# Patient Record
Sex: MALE | Race: WHITE
[De-identification: names, ages, dates, MRNs, and addresses within clinical notes are randomized per-mention and may not be internally consistent; named-entity substitution may affect disease eponyms.]

---

## 2020-07-26 ENCOUNTER — HOSPITAL ENCOUNTER (EMERGENCY)
Dept: HOSPITAL 41 - JD.ED | Age: 35
Discharge: HOME | End: 2020-07-26
Payer: COMMERCIAL
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
## 2020-07-26 DIAGNOSIS — T60.91XA: Primary | ICD-10-CM
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
## 2020-07-26 DIAGNOSIS — T20.40XA: ICD-10-CM

## 2020-07-26 NOTE — EDM.PDOC
ED HPI GENERAL MEDICAL PROBLEM





- General


Chief Complaint: Burn


Stated Complaint: CHEMICAL BURNS TO FACE


Time Seen by Provider: 07/26/20 03:10


Source of Information: Reports: Patient


History Limitations: Reports: No Limitations





- History of Present Illness


INITIAL COMMENTS - FREE TEXT/NARRATIVE: 





This is a 35-year-old male.  He was using a beta Cyfluthrin insecticide all day 

yesterday on and off and apparently got some on his face.  He noted some burning

yesterday evening and he washed his face thoroughly it did not seem to help so 

use soap and water to wash his face and that did not seem to help his face is 

still burning and slightly red so he comes to the ER for evaluation.  He says he

decontaminated himself according to the MSDS for this insecticide.  He wants 

something to stop the burning.  At this time what ever this insecticide is done 

for his skin is going to take time to resolve.


  ** Face/Facial


Pain Score (Numeric/FACES): 6





- Related Data


                                    Allergies











Allergy/AdvReac Type Severity Reaction Status Date / Time


 


No Known Allergies Allergy   Verified 07/26/20 03:08











Home Meds: 


                                    Home Meds





. [No Known Home Meds]  07/26/20 [History]











Past Medical History





- Past Surgical History


Musculoskeletal Surgical History: Reports: Shoulder Surgery





Social & Family History





- Tobacco Use


Smoking Status *Q: Never Smoker





ED ROS GENERAL





- Review of Systems


Review Of Systems: See Below


Constitutional: Reports: No Symptoms


HEENT: Reports: No Symptoms


Respiratory: Reports: No Symptoms


Cardiovascular: Reports: No Symptoms


Endocrine: Reports: No Symptoms


GI/Abdominal: Reports: No Symptoms


: Reports: No Symptoms


Musculoskeletal: Reports: No Symptoms


Skin: Reports: Other (As per HPI)


Neurological: Reports: No Symptoms


Psychiatric: Reports: No Symptoms


Hematologic/Lymphatic: Reports: No Symptoms





ED EXAM, BURN/SMOKE INHALATION





- Physical Exam


Exam: See Below


Exam Limited By: No Limitations


General Appearance: Alert, WD/WN, No Apparent Distress


Eye Exam: Bilateral Eye: Normal Inspection, Other (He denies getting any of this

 chemical in his eyes.)


Ears (Abbreviated): Normal External Exam


Mouth/Throat: No Symptoms Reported


Head: Other (He has some erythema across his nose and across his cheeks 

bilaterally but they do not extend into his lateral face or his forehead.  It 

just appears to be a erythematous noninfected burn.)


Neck: No Symptoms


Respiratory: No Respiratory Distress


Back Exam: Full Range of Motion


Extremities: Normal Inspection, Normal Range of Motion


Neurological: Alert, Oriented


Psychiatric: Normal Affect, Normal Mood


Skin Exam: Warm, Dry





Course





- Vital Signs


Last Recorded V/S: 





                                Last Vital Signs











Temp  97.5 F   07/26/20 03:04


 


Pulse  89   07/26/20 03:04


 


Resp  16   07/26/20 03:04


 


BP  140/95 H  07/26/20 03:04


 


Pulse Ox  98   07/26/20 03:04














- Re-Assessments/Exams


Free Text/Narrative Re-Assessment/Exam: 





07/26/20 03:29


I did speak to [facility] regarding this a chemical burn.  They indicate 

that bacitracin ointment or triple antibiotic ointment might make it feel better

 and cool compresses but other than that it just takes time to heal.





Departure





- Departure


Time of Disposition: 03:28


Disposition: Home, Self-Care 01


Condition: Fair


Clinical Impression: 


Chemical burn of face


Qualifiers:


 Encounter type: initial encounter Qualified Code(s): T20.40XA - Corrosion of 

unspecified degree of head, face, and neck, unspecified site, initial encounter








- Discharge Information


*PRESCRIPTION DRUG MONITORING PROGRAM REVIEWED*: Not Applicable


*COPY OF PRESCRIPTION DRUG MONITORING REPORT IN PATIENT JUANCARLOS: Not Applicable


Instructions:  Chemical Burn, Adult, Easy-to-Read


Referrals: 


PCP,None [Primary Care Provider] - 


Forms:  ED Department Discharge, ED Return to Work/School Form


Additional Instructions: 


Continue to use triple antibiotic ointment or bacitracin ointment for the next 

couple of days, stay out of the sun since a chemical burn and a sunburn will 

make this much worse, you may use cool compresses to the face to help with the 

burning, return to the ER if needed





Sepsis Event Note (ED)





- Evaluation


Sepsis Screening Result: No Definite Risk





- Focused Exam


Vital Signs: 





                                   Vital Signs











  Temp Pulse Resp BP Pulse Ox


 


 07/26/20 03:04  97.5 F  89  16  140/95 H  98

## 2020-08-09 ENCOUNTER — HOSPITAL ENCOUNTER (EMERGENCY)
Dept: HOSPITAL 41 - JD.ED | Age: 35
Discharge: HOME | End: 2020-08-09
Payer: COMMERCIAL

## 2020-08-09 DIAGNOSIS — E87.2: ICD-10-CM

## 2020-08-09 DIAGNOSIS — Z20.828: ICD-10-CM

## 2020-08-09 DIAGNOSIS — Z87.891: ICD-10-CM

## 2020-08-09 DIAGNOSIS — E86.0: ICD-10-CM

## 2020-08-09 DIAGNOSIS — E87.6: Primary | ICD-10-CM

## 2020-08-09 PROCEDURE — 99285 EMERGENCY DEPT VISIT HI MDM: CPT

## 2020-08-09 PROCEDURE — 96360 HYDRATION IV INFUSION INIT: CPT

## 2020-08-09 PROCEDURE — 85379 FIBRIN DEGRADATION QUANT: CPT

## 2020-08-09 PROCEDURE — 80306 DRUG TEST PRSMV INSTRMNT: CPT

## 2020-08-09 PROCEDURE — 96361 HYDRATE IV INFUSION ADD-ON: CPT

## 2020-08-09 PROCEDURE — 86140 C-REACTIVE PROTEIN: CPT

## 2020-08-09 PROCEDURE — 85007 BL SMEAR W/DIFF WBC COUNT: CPT

## 2020-08-09 PROCEDURE — U0002 COVID-19 LAB TEST NON-CDC: HCPCS

## 2020-08-09 PROCEDURE — 83735 ASSAY OF MAGNESIUM: CPT

## 2020-08-09 PROCEDURE — 84484 ASSAY OF TROPONIN QUANT: CPT

## 2020-08-09 PROCEDURE — 84443 ASSAY THYROID STIM HORMONE: CPT

## 2020-08-09 PROCEDURE — 93005 ELECTROCARDIOGRAM TRACING: CPT

## 2020-08-09 PROCEDURE — 87635 SARS-COV-2 COVID-19 AMP PRB: CPT

## 2020-08-09 PROCEDURE — 81001 URINALYSIS AUTO W/SCOPE: CPT

## 2020-08-09 PROCEDURE — 36415 COLL VENOUS BLD VENIPUNCTURE: CPT

## 2020-08-09 PROCEDURE — 85027 COMPLETE CBC AUTOMATED: CPT

## 2020-08-09 PROCEDURE — 80053 COMPREHEN METABOLIC PANEL: CPT

## 2020-08-09 PROCEDURE — 71046 X-RAY EXAM CHEST 2 VIEWS: CPT

## 2020-08-09 PROCEDURE — 80048 BASIC METABOLIC PNL TOTAL CA: CPT

## 2020-08-09 NOTE — EDM.PDOC
ED HPI GENERAL MEDICAL PROBLEM





- General


Chief Complaint: General


Stated Complaint: RAPID HEART RATE,SEEING YELLOW, WEAKNESS


Time Seen by Provider: 08/09/20 13:54


Source of Information: Reports: Patient, Other (Friend)


History Limitations: Reports: No Limitations





- History of Present Illness


INITIAL COMMENTS - FREE TEXT/NARRATIVE: 





Mr. Choe is a 35-year-old man with no chronic medical problems, who now 

presents to the ED complaining of generalized weakness, rapid palpitations, 

feeling hot and sweaty, and seeing yellow-tinged, spotty vision, ever since he 

woke up around 08:00 this morning.  No recent fever, chills, cough, dyspnea, 

nausea, vomiting, or recent diarrhea.  He states that he drank 2 shots of 

alcohol last night.  He denies prior similar symptoms.  He states that he did 

not take any over-the-counter or home remedies prior to coming to the ED.





Here in the ED, the patient is initially found to be tachycardic at 104 bpm, 

otherwise, he is hemodynamically stable, afebrile, saturating 98% on room air.





Other than this morning's symptoms, the patient denies recent fever, chills, 

sore throat, ear pain, nasal or sinus congestion, cough, dyspnea, chest pain, n

ausea, vomiting, constipation, diarrhea, abdominal pain, urinary symptoms, 

recent weight gain or weight loss, recent bloody bowel movements or black bowel 

movements, recent joint aches, headaches, or rashes.








The patient does not have a PCP.











- Related Data


                                    Allergies











Allergy/AdvReac Type Severity Reaction Status Date / Time


 


No Known Allergies Allergy   Verified 07/26/20 03:08











Home Meds: 


                                    Home Meds





. [No Known Home Meds]  07/26/20 [History]











Past Medical History





- Past Surgical History


Musculoskeletal Surgical History: Reports: Shoulder Surgery (left, arthroscopic)





Social & Family History





- Family History


Family Medical History: Noncontributory





- Tobacco Use


Smoking Status *Q: Former Smoker


Tobacco Use Within Last Twelve Months: Smokeless Tobacco (Quit chewing tobacco 

around Mar 2020)


Years of Tobacco use: 15


Packs/Tins Daily: 1


Month/Year Tobacco Last Used: Quit 2015





- Alcohol Use


Alcohol Use History: Yes


Alcohol Use Frequency: Socially





- Recreational Drug Use


Recreational Drug Use: Yes


Drug Use in Last 12 Months: No


Recreational Drug Type: Reports: Cocaine (hasn't snorted in "years"), 

Marijuana/Hashish (last smoked around 2010)





- Living Situation & Occupation


Living situation: Reports: Single, with Family


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





ED ROS GENERAL





- Review of Systems


Review Of Systems: Comprehensive ROS is negative, except as noted in HPI.





ED EXAM, GENERAL





- Physical Exam


Exam: See Below


Exam Limited By: No Limitations


General Appearance: Alert, No Apparent Distress, Thin


Eye Exam: Bilateral Eye: EOMI, Normal Inspection


Ears: Normal External Exam, Hearing Grossly Normal


Nose: Normal Inspection


Throat/Mouth: Normal Inspection, Normal Lips, Normal Voice, No Airway Compromise


Head: Atraumatic, Normocephalic


Neck: Normal Inspection, Full Range of Motion


Respiratory/Chest: No Respiratory Distress, Lungs Clear, Normal Breath Sounds, 

No Accessory Muscle Use


Cardiovascular: Normal Peripheral Pulses, No Edema, No Gallop, No JVD, No 

Murmur, No Rub, Tachycardia (regular)


Peripheral Pulses: 3+: Radial (L), Radial (R)


GI/Abdominal: Normal Bowel Sounds, Soft, Non-Tender, No Organomegaly, No 

Distention, No Abnormal Bruit, No Mass


 (Male) Exam: Deferred


Rectal (Males) Exam: Deferred


Back Exam: Normal Inspection, Full Range of Motion, NT


Extremities: Normal Inspection, Normal Range of Motion, No Pedal Edema, Normal 

Capillary Refill


Neurological: Alert, Oriented, CN II-XII Intact, Normal Cognition, No 

Motor/Sensory Deficits


Psychiatric: Normal Affect


Skin Exam: Warm, Dry, Intact, Normal Color, No Rash





EKG INTERPRETATION


EKG Date: 08/09/20


Time: 14:17


Rhythm: NSR


Rate (Beats/Min): 86


Axis: Normal


P-Wave: Present


QRS: RBBB (incomplete)


ST-T: Normal


QT: Normal


Comparison: NA - No Prior EKG





Course





- Vital Signs


Last Recorded V/S: 


                                Last Vital Signs











Temp  36.2 C   08/09/20 13:53


 


Pulse  104 H  08/09/20 13:53


 


Resp  16   08/09/20 13:53


 


BP  125/66   08/09/20 13:53


 


Pulse Ox  98   08/09/20 13:53








                                        





Orthostatic Blood Pressure [     117/74


Standing]                        


Orthostatic Blood Pressure [     118/71


Supine]                          











- Orders/Labs/Meds


Orders: 


                               Active Orders 24 hr











 Category Date Time Status


 


 EKG Documentation Completion [RC] STAT Care  08/09/20 14:09 Active


 


 Orthostatic Vital Signs [RC] STAT Care  08/09/20 14:09 Active


 


 CORONAVIRUS COVID-19 PCR [facility] Stat Lab  08/09/20 14:11 Ordered











Labs: 


                                Laboratory Tests











  08/09/20 08/09/20 08/09/20 Range/Units





  14:45 14:45 14:45 


 


WBC  15.08 H    (4.23-9.07)  K/mm3


 


RBC  5.27    (4.63-6.08)  M/mm3


 


Hgb  15.5    (13.7-17.5)  gm/dl


 


Hct  45.9    (40.1-51.0)  %


 


MCV  87.1    (79.0-92.2)  fl


 


MCH  29.4    (25.7-32.2)  pg


 


MCHC  33.8    (32.2-35.5)  g/dl


 


RDW Std Deviation  40.1    (35.1-43.9)  fL


 


Plt Count  323    (163-337)  K/mm3


 


MPV  9.8    (9.4-12.3)  fl


 


Neutrophils % (Manual)  89 H    (40-60)  %


 


Band Neutrophils %  0    (0-10)  %


 


Lymphocytes % (Manual)  10 L    (20-40)  %


 


Atypical Lymphs %  0    %


 


Monocytes % (Manual)  0 L    (2-10)  %


 


Eosinophils % (Manual)  0 L    (0.8-7.0)  %


 


Basophils % (Manual)  1    (0.2-1.2)  


 


Toxic Granulation      


 


Platelet Estimate  Adequate    


 


Plt Morphology Comment  Normal    


 


RBC Morph Comment  Normal    


 


D-Dimer, Quantitative    0.41  (0.19-0.50)  mg/L


 


Sodium   147 H   (136-145)  mEq/L


 


Potassium   3.1 L   (3.5-5.1)  mEq/L


 


Chloride   106   ()  mEq/L


 


Carbon Dioxide   19 L   (21-32)  mEq/L


 


Anion Gap   25.1 H   (5-15)  


 


BUN   22 H   (7-18)  mg/dL


 


Creatinine   1.5 H   (0.7-1.3)  mg/dL


 


Est Cr Clr Drug Dosing   76.40   mL/min


 


Estimated GFR (MDRD)   54   (>60)  mL/min


 


BUN/Creatinine Ratio   14.7   (14-18)  


 


Glucose   67 L   ()  mg/dL


 


Calcium   9.1   (8.5-10.1)  mg/dL


 


Magnesium   2.1   (1.8-2.4)  mg/dl


 


Total Bilirubin   0.3   (0.2-1.0)  mg/dL


 


AST   34   (15-37)  U/L


 


ALT   38   (16-63)  U/L


 


Alkaline Phosphatase   98   ()  U/L


 


Troponin I   < 0.017   (0.00-0.056)  ng/mL


 


C-Reactive Protein   <0.2   (<1.0)  mg/dL


 


Total Protein   7.9   (6.4-8.2)  g/dl


 


Albumin   4.3   (3.4-5.0)  g/dl


 


Globulin   3.6   gm/dL


 


Albumin/Globulin Ratio   1.2   (1-2)  


 


TSH 3rd Generation   0.790   (0.358-3.74)  uIU/mL


 


Urine Color     (Yellow)  


 


Urine Appearance     (Clear)  


 


Urine pH     (5.0-8.0)  


 


Ur Specific Gravity     (1.005-1.030)  


 


Urine Protein     (Negative)  


 


Urine Glucose (UA)     (Negative)  


 


Urine Ketones     (Negative)  


 


Urine Occult Blood     (Negative)  


 


Urine Nitrite     (Negative)  


 


Urine Bilirubin     (Negative)  


 


Urine Urobilinogen     (0.2-1.0)  


 


Ur Leukocyte Esterase     (Negative)  


 


Urine RBC     (0-5)  /hpf


 


Urine WBC     (0-5)  /hpf


 


Ur Squamous Epith Cells     (0-5)  /hpf


 


Urine Bacteria     (FEW)  /hpf


 


Urine Mucus     (FEW)  /hpf


 


Urine Opiates Screen     (ADBHIX=808)  


 


Ur Buprenorphine Scrn     (CUTOFF=10)  


 


Ur Oxycodone Screen     (IJZ1YL=219)  


 


Urine Methadone Screen     (PTD0ZO=827)  


 


Ur Propoxyphene Screen     (SMTXCM=404)  


 


Ur Barbiturates Screen     (HDRRJH=491)  


 


Ur Tricyclics Screen     (YNDRRE=542)  


 


Ur Phencyclidine Scrn     (CUTOFF=25)  


 


Ur Amphetamine Screen     (MWCWRA=588)  


 


U Methamphetamines Scrn     (KTHOFM=492)  


 


U Benzodiazepines Scrn     (DXNRCC=550)  


 


U Cocaine Metab Screen     (FEIAXU=341)  


 


U Marijuana (THC) Screen     (CUTOFF=50)  














  08/09/20 08/09/20 08/09/20 Range/Units





  18:08 18:12 18:12 


 


WBC     (4.23-9.07)  K/mm3


 


RBC     (4.63-6.08)  M/mm3


 


Hgb     (13.7-17.5)  gm/dl


 


Hct     (40.1-51.0)  %


 


MCV     (79.0-92.2)  fl


 


MCH     (25.7-32.2)  pg


 


MCHC     (32.2-35.5)  g/dl


 


RDW Std Deviation     (35.1-43.9)  fL


 


Plt Count     (163-337)  K/mm3


 


MPV     (9.4-12.3)  fl


 


Neutrophils % (Manual)     (40-60)  %


 


Band Neutrophils %     (0-10)  %


 


Lymphocytes % (Manual)     (20-40)  %


 


Atypical Lymphs %     %


 


Monocytes % (Manual)     (2-10)  %


 


Eosinophils % (Manual)     (0.8-7.0)  %


 


Basophils % (Manual)     (0.2-1.2)  


 


Toxic Granulation     


 


Platelet Estimate     


 


Plt Morphology Comment     


 


RBC Morph Comment     


 


D-Dimer, Quantitative     (0.19-0.50)  mg/L


 


Sodium  141    (136-145)  mEq/L


 


Potassium  4.4    (3.5-5.1)  mEq/L


 


Chloride  105    ()  mEq/L


 


Carbon Dioxide  22    (21-32)  mEq/L


 


Anion Gap  18.4 H    (5-15)  


 


BUN  19 H    (7-18)  mg/dL


 


Creatinine  1.3    (0.7-1.3)  mg/dL


 


Est Cr Clr Drug Dosing  88.15    mL/min


 


Estimated GFR (MDRD)  > 60    (>60)  mL/min


 


BUN/Creatinine Ratio  14.6    (14-18)  


 


Glucose  76    ()  mg/dL


 


Calcium  8.6    (8.5-10.1)  mg/dL


 


Magnesium     (1.8-2.4)  mg/dl


 


Total Bilirubin     (0.2-1.0)  mg/dL


 


AST     (15-37)  U/L


 


ALT     (16-63)  U/L


 


Alkaline Phosphatase     ()  U/L


 


Troponin I     (0.00-0.056)  ng/mL


 


C-Reactive Protein     (<1.0)  mg/dL


 


Total Protein     (6.4-8.2)  g/dl


 


Albumin     (3.4-5.0)  g/dl


 


Globulin     gm/dL


 


Albumin/Globulin Ratio     (1-2)  


 


TSH 3rd Generation     (0.358-3.74)  uIU/mL


 


Urine Color   Light yellow   (Yellow)  


 


Urine Appearance   Clear   (Clear)  


 


Urine pH   5.5   (5.0-8.0)  


 


Ur Specific Gravity   > or = 1.030   (1.005-1.030)  


 


Urine Protein   Negative   (Negative)  


 


Urine Glucose (UA)   Negative   (Negative)  


 


Urine Ketones   2+ H   (Negative)  


 


Urine Occult Blood   Negative   (Negative)  


 


Urine Nitrite   Negative   (Negative)  


 


Urine Bilirubin   Negative   (Negative)  


 


Urine Urobilinogen   0.2   (0.2-1.0)  


 


Ur Leukocyte Esterase   Negative   (Negative)  


 


Urine RBC   0-5   (0-5)  /hpf


 


Urine WBC   0-5   (0-5)  /hpf


 


Ur Squamous Epith Cells   0-5   (0-5)  /hpf


 


Urine Bacteria   Rare   (FEW)  /hpf


 


Urine Mucus   Few   (FEW)  /hpf


 


Urine Opiates Screen    Negative  (EMGKSU=444)  


 


Ur Buprenorphine Scrn    Negative  (CUTOFF=10)  


 


Ur Oxycodone Screen    Negative  (AQX5CN=628)  


 


Urine Methadone Screen    Negative  (XRT5XH=078)  


 


Ur Propoxyphene Screen    Negative  (UCGOMN=524)  


 


Ur Barbiturates Screen    Negative  (FOTNIB=146)  


 


Ur Tricyclics Screen    Negative  (UHMDLL=044)  


 


Ur Phencyclidine Scrn    Negative  (CUTOFF=25)  


 


Ur Amphetamine Screen    Negative  (CNDQVR=626)  


 


U Methamphetamines Scrn    Negative  (JKZVOV=784)  


 


U Benzodiazepines Scrn    Negative  (PQYAAG=054)  


 


U Cocaine Metab Screen    Negative  (CIZLLR=859)  


 


U Marijuana (THC) Screen    Negative  (CUTOFF=50)  











Meds: 


Medications














Discontinued Medications














Generic Name Dose Route Start Last Admin





  Trade Name Freq  PRN Reason Stop Dose Admin


 


Lactated Ringer's  1,000 mls @ 999 mls/hr  08/09/20 14:12  08/09/20 14:48





  Ringers, Lactated  IV  08/09/20 15:12  999 mls/hr





  .BOLUS ONE   Administration


 


Lactated Ringer's  1,000 mls @ 999 mls/hr  08/09/20 16:29  08/09/20 16:46





  Ringers, Lactated  IV  08/09/20 17:29  999 mls/hr





  .BOLUS ONE   Administration


 


Potassium Chloride  40 meq  08/09/20 16:29  08/09/20 16:46





  Klor-Con M20  PO  08/09/20 16:30  40 meq





  ONETIME ONE   Administration














- Re-Assessments/Exams


Free Text/Narrative Re-Assessment/Exam: 





08/09/20 14:13


As above, the patient woke around 08:00 this morning with generalized weakness, 

rapid palpitations, feeling hot and sweaty, and seeing everything yellowish 

tinged and spotty.  He is mildly tachycardic, but otherwise afebrile with a 

normal oxygen saturation on room air, and his physical exam is otherwise 

unremarkable.  The etiology of the patient's symptoms is not immediately 

obvious, therefore I have ordered a work-up that includes blood work, a 

urinalysis, a urine drug screen, orthostatics, a chest x-ray, and an ECG.  In 

the meantime, the patient will be given IV fluid.








08/09/20 15:22


Strictly speaking, the patient is not orthostatic, however, it is close.  As 

above, the patient is being treated with IV fluid, anyway.





2-view radiograph of the chest is read by Dr. Strauss as:


1.  Nothing acute is appreciated on 2 view chest x-ray.








08/09/20 16:21


The patient's CBC is remarkable for WBC count elevated at 15.08, but with only 

0% bandemia.  The remainder of his CBC is unremarkable.


His CMP is remarkable for a sodium elevated at 147, and a potassium depressed at

 3.1.  His bicarbonate is depressed at 19, with an anion gap elevated at 25.1.  

His BUN/Cr is elevated at 22/1.5, his blood glucose is mildly depressed at 67.  

The remainder of his CMP is unremarkable.


His magnesium level is within normal limits at 2.1.


His TSH is within normal limits at 0.790.


His troponin is undetectably low.


His D-dimer is within normal limits at 0.41.


His CRP is undetectably low.





The patient has not yet provided a urine sample for the urinalysis and urine 

drug screen.





Based on the above, I will order a second liter of LR wide open, along with 40 

mEq of oral KCl.








08/09/20 17:55


Notified that the second liter of IV fluid has finished infusing.  The patient 

has still not provided a urine sample.  I have ordered a BMP to assess how well 

his metabolic acidosis is improving.








08/09/20 18:48


The patient's repeat BMP is now remarkable for an anion gap mildly elevated at 

18.4, but with a bicarbonate normal at 22.  His BUN is slightly elevated at 19, 

with a Cr normal at 1.3.  The remainder of his BMP is unremarkable.


His urinalysis is remarkable for 2+ ketones, and is otherwise completely normal.


His urine drug screen is completely negative.








08/09/20 18:53


Test results discussed with the patient.  As above, the patient's initial 

chemistry panel indicated some degree of dehydration with an anion gap metabolic

 acidosis and hypokalemia, which has essentially resolved following 2 L of IV 

fluid and oral KCl.  The remainder of his work-up was completely unremarkable.  

He states that he is feeling considerably better, and requested a sandwich, 

which has been provided to him.  The patient stated that he did not understand 

how he can get dehydrated, since he was drinking water yesterday.  I explained 

that the labs do not tell us how he became dehydrated, only that he was 

dehydrated.  Going forward, I recommended that he stay adequately hydrated with 

electrolyte-containing fluid, such as Gatorade or Powerade.





Departure





- Departure


Time of Disposition: 18:56


Disposition: Home, Self-Care 01


Condition: Good


Clinical Impression: 


 Dehydration, High anion gap metabolic acidosis, Hypokalemia








- Discharge Information


*PRESCRIPTION DRUG MONITORING PROGRAM REVIEWED*: Not Applicable


*COPY OF PRESCRIPTION DRUG MONITORING REPORT IN PATIENT JUANCARLOS: Not Applicable


Referrals: 


PCP,None [Primary Care Provider] - 


Forms:  ED Department Discharge


Additional Instructions: 


You were seen in the emergency room after waking up today feeling generally 

weak, with rapid palpitations, feeling hot and sweaty, and with yellow-tinged, 

spotty vision.





Work-up in the ER included blood work, a urinalysis, a urine drug screen, 

positional blood pressure checks, a chest x-ray, and an ECG.





Your work-up found that you had mild dehydration, with an anion gap metabolic 

acidosis and low potassium.





You were treated with 2 L of IV fluid and oral potassium chloride, and your 

subsequent labs showed nearly complete resolution.





The remainder of your work-up was completely unremarkable.  You are not anemic. 

You have not suffered a heart attack.  You do not have a blood clot in your 

lungs.  You do not have pneumonia.  You do not have a urinary tract infection.  

There is no sign of vasculitis (inflammation of the blood vessels).  You are not

hypothyroid.





Going forward, we recommend that you stay adequately hydrated with electrolyte-

containing fluid, such as Gatorade or Powerade.





If any other problems, please do not hesitate to return to the ER.





Sepsis Event Note (ED)





- Evaluation


Sepsis Screening Result: No Definite Risk





- Focused Exam


Vital Signs: 


                                   Vital Signs











  Temp Pulse Resp BP Pulse Ox


 


 08/09/20 13:53  36.2 C  104 H  16  125/66  98














- My Orders


Last 24 Hours: 


My Active Orders





08/09/20 14:09


EKG Documentation Completion [RC] STAT 


Orthostatic Vital Signs [RC] STAT 





08/09/20 14:11


CORONAVIRUS COVID-19 PCR PHL Stat 














- Assessment/Plan


Last 24 Hours: 


My Active Orders





08/09/20 14:09


EKG Documentation Completion [RC] STAT 


Orthostatic Vital Signs [RC] STAT 





08/09/20 14:11


CORONAVIRUS COVID-19 PCR PHL Stat

## 2020-08-09 NOTE — CR
Chest: 2 views of the chest were obtained.

 

Comparison: No prior chest x-ray.

 

Heart size and mediastinum are normal.  Lungs are clear with no acute 

parenchymal change.  Bony structures are grossly intact.

 

Impression:

1.  Nothing acute is appreciated on 2 view chest x-ray.

 

Diagnostic code #1

 

This report was dictated in MDT

## 2020-09-28 ENCOUNTER — HOSPITAL ENCOUNTER (EMERGENCY)
Dept: HOSPITAL 41 - JD.ED | Age: 35
Discharge: HOME | End: 2020-09-28
Payer: COMMERCIAL

## 2020-09-28 DIAGNOSIS — S61.012A: Primary | ICD-10-CM

## 2020-09-28 DIAGNOSIS — F17.210: ICD-10-CM
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
## 2020-09-28 NOTE — EDM.PDOC
ED HPI GENERAL MEDICAL PROBLEM





- General


Chief Complaint: Laceration


Stated Complaint: L THUMB LAC


Time Seen by Provider: 09/28/20 15:06


Source of Information: Reports: Patient, RN Notes Reviewed


History Limitations: Reports: No Limitations





- History of Present Illness


INITIAL COMMENTS - FREE TEXT/NARRATIVE: 





Patient is a 35-year-old male who presents to the ED for evaluation of a left 

thumb laceration.  He was at work, cutting something with utility knife, and he 

ended up lacerating his left thumb.  This is near the DIP joint, on the radius 

aspect of the thumb.  This does not involve the nail.  He has no numbness or 

tingling distal to the injury.  This is roughly 1 cm and  fairly linear.  He 

states he is up-to-date on his immunizations, and he denies any sick-like sympt

oms, fever/chills, cough/shortness of breath, nausea/vomiting/diarrhea.


  ** Left Finger-Thumb


Pain Score (Numeric/FACES): 4





- Related Data


                                    Allergies











Allergy/AdvReac Type Severity Reaction Status Date / Time


 


No Known Allergies Allergy   Verified 09/28/20 15:09











Home Meds: 


                                    Home Meds





. [No Known Home Meds]  07/26/20 [History]











Past Medical History


Psychiatric History: Reports: Anxiety





- Past Surgical History


Musculoskeletal Surgical History: Reports: Shoulder Surgery





Social & Family History





- Family History


Family Medical History: Noncontributory





- Tobacco Use


Smoking Status *Q: Current Some Day Smoker


Years of Tobacco use: 1


Packs/Tins Daily: 0





- Caffeine Use


Caffeine Use: Reports: None, Soda





- Recreational Drug Use


Recreational Drug Use: No





- Living Situation & Occupation


Living situation: Reports: Single, with Family


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





ED ROS GENERAL





- Review of Systems


Review Of Systems: Comprehensive ROS is negative, except as noted in HPI.





ED EXAM, SKIN/RASH


Exam: See Below


Exam Limited By: No Limitations


General Appearance: Alert, WD/WN, No Apparent Distress


Respiratory/Chest: No Respiratory Distress, Lungs Clear, Normal Breath Sounds, 

No Accessory Muscle Use, Chest Non-Tender


Cardiovascular: Normal Peripheral Pulses, Regular Rate, Rhythm, No Murmur


Peripheral Pulses: 2+: Radial (L), Radial (R)


Extremities: Normal Inspection, Normal Capillary Refill


Neurological: Alert, Oriented, Normal Cognition, No Motor/Sensory Deficits


Psychiatric: Normal Affect, Normal Mood


Skin: Warm, Dry, Normal Color, No Rash, Wound/Incision (1cm linear laceration to

 left thumb DIP on radius aspect of thumb)





ED SKIN PROCEDURES





- Laceration/Wound Repair


  ** Left Lateral Distal Digit - 1st (Thumb)


Appearance: Superficial


Distal NVT: Neuro & Vascular Intact, No Tendon Injury


Anesthetic Type: Local


Local Anesthesia - Lidocaine (Xylocaine): 1% Plain


Local Anesthetic Volume: 2cc


Skin Prep: Chlorhexidine (Hibiciens), Saline


Exploration/Debridement/Repair: Wound Explored, In a Bloodless Field, Explored 

to Base, No Foreign Material Found


Closed with: Sutures


Lac/Wound length In cm: 1


Suture Size: 4-0


# of Sutures: 3


Suture Type: Prolene, Interrupted, Simple


Sterile Dressing Applied: Nurse


Tetanus Status Addressed: Yes


Complications: No





Course





- Vital Signs


Last Recorded V/S: 


                                Last Vital Signs











Temp  97.8 F   09/28/20 15:05


 


Pulse  85   09/28/20 15:05


 


Resp  18   09/28/20 15:05


 


BP  149/89 H  09/28/20 15:05


 


Pulse Ox  98   09/28/20 15:05














- Orders/Labs/Meds


Meds: 


Medications














Discontinued Medications














Generic Name Dose Route Start Last Admin





  Trade Name Freq  PRN Reason Stop Dose Admin


 


Lidocaine HCl  10 ml  09/28/20 15:40 





  Xylocaine 1%  INJECT  09/28/20 15:41 





  ONETIME ONE  














Departure





- Departure


Time of Disposition: 15:43


Disposition: Home, Self-Care 01


Condition: Good


Clinical Impression: 


Thumb laceration


Qualifiers:


 Encounter type: initial encounter Damage to nail status: without damage Foreign

 body presence: without foreign body Laterality: left Qualified Code(s): 

S61.012A - Laceration without foreign body of left thumb without damage to nail,

 initial encounter








- Discharge Information


*PRESCRIPTION DRUG MONITORING PROGRAM REVIEWED*: No


*COPY OF PRESCRIPTION DRUG MONITORING REPORT IN PATIENT JUANCARLOS: No


Instructions:  Sutured Wound Care, Easy-to-Read


Referrals: 


Marychuy Shirley NP [Primary Care Provider] - 


Forms:  ED Department Discharge


Additional Instructions: 


You have been evaluated in the ED for your laceration.





Sutures will need to stay in for 10-14 days. 





You may return to the ED or any clinic for removal.





Please keep this area clean and dry, you may cleanse with regular soap and 

water. No vigorous scrubbing. Please try to avoid submerging the affected area 

in water for prolonged periods of time until the sutures are removed.





Watch out for signs of infection like increased redness, swelling, pain at the 

laceration site, or if you should develop any fevers or chills.





Please return to ED if your symptoms change or worsen.








Sepsis Event Note (ED)





- Evaluation


Sepsis Screening Result: No Definite Risk





- Focused Exam


Vital Signs: 


                                   Vital Signs











  Temp Pulse Resp BP Pulse Ox


 


 09/28/20 15:05  97.8 F  85  18  149/89 H  98

## 2021-12-22 ENCOUNTER — HOSPITAL ENCOUNTER (EMERGENCY)
Dept: HOSPITAL 41 - JD.ED | Age: 36
Discharge: HOME | End: 2021-12-22
Payer: COMMERCIAL

## 2021-12-22 DIAGNOSIS — I48.91: Primary | ICD-10-CM

## 2021-12-22 DIAGNOSIS — Z87.891: ICD-10-CM

## 2021-12-22 DIAGNOSIS — Z20.822: ICD-10-CM

## 2021-12-22 PROCEDURE — 84443 ASSAY THYROID STIM HORMONE: CPT

## 2021-12-22 PROCEDURE — 99285 EMERGENCY DEPT VISIT HI MDM: CPT

## 2021-12-22 PROCEDURE — 85730 THROMBOPLASTIN TIME PARTIAL: CPT

## 2021-12-22 PROCEDURE — 85610 PROTHROMBIN TIME: CPT

## 2021-12-22 PROCEDURE — 96365 THER/PROPH/DIAG IV INF INIT: CPT

## 2021-12-22 PROCEDURE — 83735 ASSAY OF MAGNESIUM: CPT

## 2021-12-22 PROCEDURE — 71045 X-RAY EXAM CHEST 1 VIEW: CPT

## 2021-12-22 PROCEDURE — 80048 BASIC METABOLIC PNL TOTAL CA: CPT

## 2021-12-22 PROCEDURE — 84484 ASSAY OF TROPONIN QUANT: CPT

## 2021-12-22 PROCEDURE — 36415 COLL VENOUS BLD VENIPUNCTURE: CPT

## 2021-12-22 PROCEDURE — 93005 ELECTROCARDIOGRAM TRACING: CPT

## 2021-12-22 PROCEDURE — U0002 COVID-19 LAB TEST NON-CDC: HCPCS

## 2021-12-22 PROCEDURE — 85025 COMPLETE CBC W/AUTO DIFF WBC: CPT

## 2021-12-22 PROCEDURE — 85379 FIBRIN DEGRADATION QUANT: CPT

## 2021-12-22 PROCEDURE — 87635 SARS-COV-2 COVID-19 AMP PRB: CPT

## 2021-12-22 NOTE — CR
Chest: Portable view of the chest was obtained.

 

Comparison: Prior chest x-ray of 08/09/20.

 

Heart size and mediastinum are within normal limits.  Lungs are clear 

with no acute parenchymal change.  Bony structures show nothing acute.

 

Impression:

1.  Nothing acute is seen on portable chest x-ray.

 

Diagnostic code #1

## 2021-12-22 NOTE — EDM.PDOC
ED HPI GENERAL MEDICAL PROBLEM





- General


Chief Complaint: Cardiovascular Problem


Stated Complaint: HEART RACING


Time Seen by Provider: 12/22/21 02:37


Source of Information: Reports: Patient


History Limitations: Reports: No Limitations





- History of Present Illness


INITIAL COMMENTS - FREE TEXT/NARRATIVE: 





Mr. Choe is a very pleasant 36-year-old gentleman who now presents the ED 

with rapid palpitations that developed suddenly around 02 100 this morning, just

after he went to bed. He has no associated chest pain, lightheadedness, dyspnea,

or any other symptoms. No prior similar symptoms.  An ECG obtained at triage 

demonstrates atrial fibrillation with RVR.





The patient states that he does not drink caffeine or take drugs. No alcohol in 

the past 1.5 months.





At triage, the patient's initial BP was found to be mildly elevated at 152/108, 

otherwise, he was hemodynamically stable, afebrile, saturating 95% on room air. 

He appears to be comfortable, in no acute distress.





Prior to this morning, the patient denies having a recent fever, chills, sore 

throat, ear pain, nasal or sinus congestion, cough, dyspnea, chest pain, 

palpitations, nausea, vomiting, constipation, diarrhea, abdominal pain, urinary 

symptoms, recent weight gain or weight loss, recent bloody bowel movements or 

black bowel movements, recent joint aches, headaches, or rashes.








The patient's PCP is Marychuy Shirley NP.


He has not received a COVID vaccination, nor an influenza vaccination this 

season.











- Related Data


                                    Allergies











Allergy/AdvReac Type Severity Reaction Status Date / Time


 


No Known Allergies Allergy   Verified 09/28/20 15:09











Home Meds: 


                                    Home Meds





. [No Known Home Meds]  07/26/20 [History]











Past Medical History


Psychiatric History: Reports: Anxiety (untreated)





- Past Surgical History


Musculoskeletal Surgical History: Reports: Shoulder Surgery (left, arthroscopic)





Social & Family History





- Tobacco Use


Tobacco Use Status *Q: Former Tobacco User


Tobacco Use Within Last Twelve Months: Smokeless Tobacco (Quit chewing tobacco 

around Mar 2020)


Years of Tobacco use: 19


Packs/Tins Daily: 1


Month/Year Tobacco Last Used: Quit June 2021


Tobacco Use Comment: Started smoking 2002





- Caffeine Use


Caffeine Use: Reports: None





- Alcohol Use


Alcohol Use History: Yes


Alcohol Use Frequency: Socially





- Recreational Drug Use


Recreational Drug Use: Yes


Drug Use in Last 12 Months: No


Recreational Drug Type: Reports: Cocaine (hasn't snorted in "years"), Marijuana

/Hashish (last smoked 2010)





- Living Situation & Occupation


Living situation: Reports: Single, with Family


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





ED ROS GENERAL





- Review of Systems


Review Of Systems: Comprehensive ROS is negative, except as noted in HPI.





ED EXAM, GENERAL





- Physical Exam


Exam: See Below


Exam Limited By: No Limitations


General Appearance: Alert, WD/WN, No Apparent Distress


Eye Exam: Bilateral Eye: EOMI, Normal Inspection


Ears: Normal External Exam, Hearing Grossly Normal


Nose: Normal Inspection


Throat/Mouth: Normal Inspection, Normal Lips, Normal Voice, No Airway Compromise


Head: Atraumatic, Normocephalic


Neck: Normal Inspection, Full Range of Motion


Respiratory/Chest: No Respiratory Distress, Lungs Clear, Normal Breath Sounds, 

No Accessory Muscle Use


Cardiovascular: Normal Peripheral Pulses, No Edema, No Gallop, No JVD, No 

Murmur, No Rub, Tachycardia, Irregularly Irregular


Peripheral Pulses: 3+: Radial (L), Radial (R)


GI/Abdominal: Normal Bowel Sounds, Soft, Non-Tender, No Organomegaly, No 

Distention, No Abnormal Bruit, No Mass


Back Exam: Normal Inspection, Full Range of Motion, NT


Extremities: Normal Inspection, Normal Range of Motion, No Pedal Edema, Normal 

Capillary Refill


Neurological: Alert, Oriented, Normal Cognition, No Motor/Sensory Deficits


Psychiatric: Normal Affect


Skin Exam: Warm, Dry, Intact, Normal Color, No Rash


  ** #1 Interpretation


EKG Date: 12/22/21


Time: 02:22


Rhythm: A-Fib


Rate (Beats/Min): 123


Axis: Normal


P-Wave: Present


QRS: Normal


ST-T: Normal


QT: Normal


Comparison: Change From Previous EKG (Was in NSR 8/9/2020)





  ** #2 Interpretation


EKG Date: 12/22/21


Time: 05:06


Rhythm: Other (Sinus bradycardia)


Rate (Beats/Min): 58


Axis: Normal


P-Wave: Present


QRS: Normal


ST-T: Normal (J-point elevation V3-V6, II, aVF, but no T wave inversions or 

other ischemic changes)


QT: Normal


Comparison: Change From Previous EKG





Course





- Vital Signs


Last Recorded V/S: 


                                Last Vital Signs











Temp  36.4 C   12/22/21 05:13


 


Pulse  65   12/22/21 05:13


 


Resp  16   12/22/21 05:13


 


BP  113/81   12/22/21 05:13


 


Pulse Ox  97   12/22/21 05:13














- Orders/Labs/Meds


Orders: 


                               Active Orders 24 hr











 Category Date Time Status


 


 Chest 1V Frontal [CR] Stat Exams  12/22/21 02:55 Taken


 


 Diltiazem [Cardizem] 100 mg Med  12/22/21 03:00 Active





 Sodium Chloride 0.9% [Normal Saline AdvBag] 100 ml   





 IV TITRATE   


 


 Sodium Chloride 0.9% [Normal Saline] 1,000 ml Med  12/22/21 03:00 Active





 IV ASDIRECTED   








                                Medication Orders





Diltiazem HCl 100 mg/ Sodium (Chloride)  100 mls @ 10 mls/hr IV TITRATE DARIUS; 

Protocol


   Last Titration: 12/22/21 05:05  Dose: 0 mg/hr, 0 mls/hr


   Documented by: MITALI


   Admin: 12/22/21 03:36  Dose: 10 mg/hr, 10 mls/hr


   Documented by: MITALI


Sodium Chloride (Normal Saline)  1,000 mls @ 80 mls/hr IV ASDIRECTED DARIUS


   Last Infusion: 12/22/21 05:39  Dose: 0 mls/hr


   Documented by: MITALI


   Admin: 12/22/21 03:39  Dose: 80 mls/hr


   Documented by: MITALI








Labs: 


                                Laboratory Tests











  12/22/21 12/22/21 12/22/21 Range/Units





  02:33 02:33 02:33 


 


WBC  6.17    (4.23-9.07)  K/mm3


 


RBC  5.41    (4.63-6.08)  M/mm3


 


Hgb  16.1    (13.7-17.5)  gm/dl


 


Hct  46.1    (40.1-51.0)  %


 


MCV  85.2    (79.0-92.2)  fl


 


MCH  29.8    (25.7-32.2)  pg


 


MCHC  34.9    (32.2-35.5)  g/dl


 


RDW Std Deviation  37.6    (35.1-43.9)  fL


 


Plt Count  273    (163-337)  K/mm3


 


MPV  9.5    (9.4-12.3)  fl


 


Neut % (Auto)  51.6    (34.0-67.9)  %


 


Lymph % (Auto)  35.0    (21.8-53.1)  %


 


Mono % (Auto)  9.2    (5.3-12.2)  %


 


Eos % (Auto)  2.9    (0.8-7.0)  


 


Baso % (Auto)  1.1    (0.1-1.2)  %


 


Neut # (Auto)  3.18    (1.78-5.38)  K/mm3


 


Lymph # (Auto)  2.16    (1.32-3.57)  K/mm3


 


Mono # (Auto)  0.57    (0.30-0.82)  K/mm3


 


Eos # (Auto)  0.18    (0.04-0.54)  K/mm3


 


Baso # (Auto)  0.07    (0.01-0.08)  K/mm3


 


PT    10.3  (9.7-12.0)  SECONDS


 


INR    0.93  


 


APTT    21.9  (21.7-31.4)  SECONDS


 


D-Dimer, Quantitative    0.42  (0.19-0.50)  mg/L


 


Sodium   142   (136-145)  mEq/L


 


Potassium   3.6   (3.5-5.1)  mEq/L


 


Chloride   101   ()  mEq/L


 


Carbon Dioxide   30   (21-32)  mEq/L


 


Anion Gap   14.6   (5-15)  


 


BUN   16   (7-18)  mg/dL


 


Creatinine   1.2   (0.7-1.3)  mg/dL


 


Est Cr Clr Drug Dosing   100.17   mL/min


 


Estimated GFR (MDRD)   > 60   (>60)  mL/min


 


BUN/Creatinine Ratio   13.3 L   (14-18)  


 


Glucose   119 H   (70-99)  mg/dL


 


Calcium   9.5   (8.5-10.1)  mg/dL


 


Magnesium   2.2   (1.8-2.4)  mg/dL


 


Troponin I   < 0.017   (0.00-0.056)  ng/mL


 


TSH 3rd Generation   2.868   (0.358-3.74)  uIU/mL


 


SARS-CoV-2 RNA (SOPHIE)     (NEGATIVE)  














  12/22/21 Range/Units





  03:23 


 


WBC   (4.23-9.07)  K/mm3


 


RBC   (4.63-6.08)  M/mm3


 


Hgb   (13.7-17.5)  gm/dl


 


Hct   (40.1-51.0)  %


 


MCV   (79.0-92.2)  fl


 


MCH   (25.7-32.2)  pg


 


MCHC   (32.2-35.5)  g/dl


 


RDW Std Deviation   (35.1-43.9)  fL


 


Plt Count   (163-337)  K/mm3


 


MPV   (9.4-12.3)  fl


 


Neut % (Auto)   (34.0-67.9)  %


 


Lymph % (Auto)   (21.8-53.1)  %


 


Mono % (Auto)   (5.3-12.2)  %


 


Eos % (Auto)   (0.8-7.0)  


 


Baso % (Auto)   (0.1-1.2)  %


 


Neut # (Auto)   (1.78-5.38)  K/mm3


 


Lymph # (Auto)   (1.32-3.57)  K/mm3


 


Mono # (Auto)   (0.30-0.82)  K/mm3


 


Eos # (Auto)   (0.04-0.54)  K/mm3


 


Baso # (Auto)   (0.01-0.08)  K/mm3


 


PT   (9.7-12.0)  SECONDS


 


INR   


 


APTT   (21.7-31.4)  SECONDS


 


D-Dimer, Quantitative   (0.19-0.50)  mg/L


 


Sodium   (136-145)  mEq/L


 


Potassium   (3.5-5.1)  mEq/L


 


Chloride   ()  mEq/L


 


Carbon Dioxide   (21-32)  mEq/L


 


Anion Gap   (5-15)  


 


BUN   (7-18)  mg/dL


 


Creatinine   (0.7-1.3)  mg/dL


 


Est Cr Clr Drug Dosing   mL/min


 


Estimated GFR (MDRD)   (>60)  mL/min


 


BUN/Creatinine Ratio   (14-18)  


 


Glucose   (70-99)  mg/dL


 


Calcium   (8.5-10.1)  mg/dL


 


Magnesium   (1.8-2.4)  mg/dL


 


Troponin I   (0.00-0.056)  ng/mL


 


TSH 3rd Generation   (0.358-3.74)  uIU/mL


 


SARS-CoV-2 RNA (SOPHIE)  Negative  (NEGATIVE)  











Meds: 


Medications











Generic Name Dose Route Start Last Admin





  Trade Name Freq  PRN Reason Stop Dose Admin


 


Diltiazem HCl 100 mg/ Sodium  100 mls @ 10 mls/hr  12/22/21 03:00  12/22/21 

05:05





  Chloride  IV   0 mg/hr





  TITRATE DARIUS   0 mls/hr





    Titration





  Protocol  





  10 MG/HR  


 


Sodium Chloride  1,000 mls @ 80 mls/hr  12/22/21 03:00  12/22/21 05:39





  Normal Saline  IV   0 mls/hr





  ASDIRECTED DARIUS   Infusion














Discontinued Medications














Generic Name Dose Route Start Last Admin





  Trade Name Ismaq  PRN Reason Stop Dose Admin


 


Diltiazem HCl  10 mg  12/22/21 02:53  12/22/21 03:25





  Diltiazem 50 Mg/10 Ml Sdv  IVPUSH  12/22/21 02:54  10 mg





  ONETIME STA   Administration














- Re-Assessments/Exams


Free Text/Narrative Re-Assessment/Exam: 





12/22/21 02:58


I have ordered numerous blood tests, a portable chest x-ray, and a swab for the 

SARS-CoV-2 virus. In the meantime, the patient will be given diltiazem 10 mg IVP

followed by a diltiazem drip at 10 mg/h, along with gentle hydration.





The patient's [id number] risk stratification score = 0, therefore I will not 

be recommending anticoagulation.








12/22/21 03:48


Portable chest radiograph appears to be grossly normal.  The cardiac silhouette 

is within normal limits.  No pulmonary vascular congestion.  No pleural 

effusions seen on this AP view.  No focal infiltrate.  No pneumothorax.  Formal 

read per the Radiologist pending.





The patient's CBC is unremarkable.


His BMP is remarkable for slight hyperglycemia of 119, and is otherwise 

unremarkable.


His magnesium level is within normal limits at 2.2.


His TSH is within normal limits at 2.868.


His troponin is undetectably low.


His D-dimer is within normal limits at 0.42.


His coags are within normal limits.





Results of the patient's swab for the SARS-CoV-2 virus is still pending.





At present, the patient's telemetry monitor indicates atrial fibrillation with a

HR of 82, with a BP of 123/98, SpO2 94% on room air.








12/22/21 04:26


The patient's swab for the SARS-CoV-2 virus is negative.





At this time, the patient's telemetry monitor indicates atrial fibrillation with

a HR at 68, with a BP of 116/90, SpO2 95% on room air.








12/22/21 04:35


Test results discussed with the patient.  I recommended admission to the ICU for

continued care.  He agreed.








12/22/21 04:37


Case discussed with Dr. Paul at 04:35.  He agreed to admit the patient to the

ICU.  I will write bridge orders.








12/22/21 04:59


The patient appears to have converted to a normal sinus rhythm.  I ordered 

discontinuation of the diltiazem drip and a repeat ECG.








12/22/21 05:29


The repeat ECG confirms a sinus bradycardia 58 bpm.  I will discharge the 

patient home with an outpatient order for an echocardiogram, with results to go 

to his PCP, Marychuy Shirley NP.  She will then need to refer the patient to a 

cardiologist for further evaluation.  The patient requested a note to be off 

work today.











Departure





- Departure


Time of Disposition: 05:35


Disposition: Home, Self-Care 01


Condition: Good


Clinical Impression: 


 Atrial fibrillation with rapid ventricular response, New onset atrial 

fibrillation





Instructions:  Atrial Fibrillation, Easy-to-Read


Referrals: 


Marychuy Shirley NP [Primary Care Provider] - 


Forms:  ED Department Discharge, ED Return to Work/School Form


Additional Instructions: 


You were seen in the emergency room after developing rapid palpitations as you 

were going to bed.





Work-up in the ER included numerous blood tests, a swab for the SARS-CoV-2 

virus, a chest x-ray, and 2 ECGs.





Your initial ECG confirmed that you were in atrial fibrillation with rapid 

ventricular response.





Your heart rate was reduced with IV diltiazem.





Your heart converted to a normal sinus rhythm spontaneously, as confirmed by the

second ECG.





An order for an outpatient echocardiogram has been submitted.  You will be 

contacted by the ultrasound department.  The results are to go to your PCP, Marychuy Shirley NP.





When you follow-up with Ms. Shirley, you should be referred to a Cardiologist.





If any other problems, including recurrence of your symptoms, please do not 

hesitate to return to the ER.





Sepsis Event Note (ED)





- Evaluation


Sepsis Screening Result: No Definite Risk





- Focused Exam


Vital Signs: 


                                   Vital Signs











  Temp Pulse Resp BP Pulse Ox


 


 12/22/21 05:13  36.4 C  65  16  113/81  97


 


 12/22/21 04:03  36.3 C  72  18  116/90  95


 


 12/22/21 02:18  36.3 C  98  18  152/108 H  95














- My Orders


Last 24 Hours: 


My Active Orders





12/22/21 02:55


Chest 1V Frontal [CR] Stat 





12/22/21 03:00


Diltiazem [Cardizem] 100 mg   Sodium Chloride 0.9% [Normal Saline AdvBag] 100 ml

IV TITRATE 


Sodium Chloride 0.9% [Normal Saline] 1,000 ml IV ASDIRECTED 














- Assessment/Plan


Last 24 Hours: 


My Active Orders





12/22/21 02:55


Chest 1V Frontal [CR] Stat 





12/22/21 03:00


Diltiazem [Cardizem] 100 mg   Sodium Chloride 0.9% [Normal Saline AdvBag] 100 ml

IV TITRATE 


Sodium Chloride 0.9% [Normal Saline] 1,000 ml IV ASDIRECTED